# Patient Record
Sex: MALE | Race: BLACK OR AFRICAN AMERICAN
[De-identification: names, ages, dates, MRNs, and addresses within clinical notes are randomized per-mention and may not be internally consistent; named-entity substitution may affect disease eponyms.]

---

## 2017-01-11 ENCOUNTER — HOSPITAL ENCOUNTER (EMERGENCY)
Dept: HOSPITAL 17 - NEPB | Age: 22
Discharge: HOME | End: 2017-01-11
Payer: SELF-PAY

## 2017-01-11 VITALS
HEART RATE: 80 BPM | DIASTOLIC BLOOD PRESSURE: 80 MMHG | RESPIRATION RATE: 14 BRPM | OXYGEN SATURATION: 97 % | TEMPERATURE: 98.8 F | SYSTOLIC BLOOD PRESSURE: 131 MMHG

## 2017-01-11 VITALS — BODY MASS INDEX: 25.06 KG/M2 | WEIGHT: 165.35 LBS | HEIGHT: 68 IN

## 2017-01-11 DIAGNOSIS — J40: Primary | ICD-10-CM

## 2017-01-11 DIAGNOSIS — F19.90: ICD-10-CM

## 2017-01-11 DIAGNOSIS — Z72.0: ICD-10-CM

## 2017-01-11 DIAGNOSIS — J45.909: ICD-10-CM

## 2017-01-11 PROCEDURE — 99283 EMERGENCY DEPT VISIT LOW MDM: CPT

## 2017-01-11 NOTE — PD
HPI


Chief Complaint:  Cold / Flu Symptoms


Time Seen by Provider:  19:16


Travel History


International Travel<30 days:  No


Contact w/Intl Traveler<30days:  No


Traveled to known affect area:  No





History of Present Illness


HPI


21-year-old male presents with a cough.  Symptoms started 2 weeks ago.  The 

cough is nonproductive.  He denies fevers, chills, sore throat, congestion, ear 

pain.  He denies rash, recent travel.  His daughter has a cough as well.  He 

does endorse tobacco product use.  No significant past medical history.  No 

other complaints at this time.





PFS


Past Medical History


ADHD:  No


Asthma:  Yes


Bipolar Disorder:  Yes


Cancer:  No


Cardiovascular Problems:  Yes ("I was told." Heart skips a beat.)


Diabetes:  No


Diminished Hearing:  No


Headaches:  No


Psychiatric:  No


Migraines:  No


Seizures:  No


Thyroid Disease:  No


Ulcer:  No





Past Surgical History


Other Surgery:  No





Social History


Alcohol Use:  Yes


Tobacco Use:  Yes (COUPLE CIGARETTS NOW AND THEN)


Substance Use:  Yes





Allergies-Medications


(Allergen,Severity, Reaction):  


Coded Allergies:  


     Lactose (Verified  Allergy, Mild, 1/11/17)


Reported Meds & Prescriptions





Reported Meds & Active Scripts


Active


No Active Prescriptions or Reported Medications    








Review of Systems


Except as stated in HPI:  all other systems reviewed are Neg





Physical Exam


Narrative


GENERAL: Well-developed well-nourished male in no acute distress


SKIN: Warm and dry.


HEAD: Atraumatic. Normocephalic. 


EYES: Pupils equal and round. No scleral icterus. No injection or drainage. 


ENT: No nasal bleeding or discharge.  Mucous membranes pink and moist.  No oral 

pharyngeal erythema or exudate


NECK: Trachea midline. No JVD.  No lymphadenopathy


CARDIOVASCULAR: Regular rate and rhythm.  No murmur appreciated.


RESPIRATORY: No accessory muscle use. Clear to auscultation. Breath sounds 

equal bilaterally.  No crackles no wheezing no rhonchi





Data


Data


Last Documented VS





Vital Signs








  Date Time  Temp Pulse Resp B/P Pulse Ox O2 Delivery O2 Flow Rate FiO2


 


1/11/17 18:18 98.8 80 14 131/80 97 Room Air  











MDM


Medical Decision Making


Medical Screen Exam Complete:  Yes


Emergency Medical Condition:  Yes


Medical Record Reviewed:  Yes


Differential Diagnosis


Bronchitis, pneumonia, influenza, reactive airway disease, bronchiolitis


Narrative Course


21-year-old male presents with 2 weeks of nonproductive cough and no other 

symptoms.  Examination is benign.  He appears to have a viral bronchitis.  

Symptomatic therapy recommended.  Stable for discharge.





Diagnosis





 Primary Impression:  


 Bronchitis





***Additional Instructions:


Tessalon for cough.  Stay well hydrated.  Follow-up with primary care 

physician.  Return for any emergent medical conditions.


***Med/Other Pt SpecificInfo:  Prescription(s) given


Scripts


Benzonatate (Tessalon Perles)100 Mg [id number] Mg PO TID PRN (COUGH) #20 CAP  Ref 0


   Prov:Cuong Lozano MD         1/11/17


Disposition:  01 DISCHARGE HOME


Condition:  Stable








Avtar Vital Jan 11, 2017 19:24

## 2018-03-01 ENCOUNTER — HOSPITAL ENCOUNTER (EMERGENCY)
Dept: HOSPITAL 17 - NEPD | Age: 23
LOS: 1 days | Discharge: HOME | End: 2018-03-02
Payer: SELF-PAY

## 2018-03-01 VITALS
DIASTOLIC BLOOD PRESSURE: 58 MMHG | SYSTOLIC BLOOD PRESSURE: 129 MMHG | TEMPERATURE: 99 F | OXYGEN SATURATION: 100 % | RESPIRATION RATE: 14 BRPM | HEART RATE: 66 BPM

## 2018-03-01 VITALS — WEIGHT: 159.84 LBS | BODY MASS INDEX: 24.22 KG/M2 | HEIGHT: 68 IN
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
## 2018-03-01 DIAGNOSIS — S61.011A: Primary | ICD-10-CM

## 2018-03-01 PROCEDURE — 12001 RPR S/N/AX/GEN/TRNK 2.5CM/<: CPT

## 2018-03-02 NOTE — PD
HPI


Chief Complaint:  Laceration/Skin Injury


Time Seen by Provider:  00:10


Travel History


International Travel<30 days:  No


Contact w/Intl Traveler<30days:  No


Traveled to known affect area:  No





History of Present Illness


HPI


22-year-old right-hand-dominant black male presents with a laceration to his 

right thumb which occurred earlier this evening.  He had stuck his hand in a 

broken glass in the sink.  Pain is mild.  Up-to-date with immunizations.  No 

numbness or tingling.





PFSH


Past Medical History


Medical History:  Denies Significant Hx


ADHD:  No


Asthma:  Yes


Bipolar Disorder:  Yes


Birth Weight (Kg):  Unknown


Cancer:  No


Cardiovascular Problems:  Yes ("I was told." Heart skips a beat.)


Diabetes:  No


Diminished Hearing:  No


Headaches:  No


Psychiatric:  No


Migraines:  No


Seizures:  No


Thyroid Disease:  No


Ulcer:  No


Tetanus Vaccination:  Unknown


Influenza Vaccination:  No





Past Surgical History


Surgical History:  No Previous Surgery


Other Surgery:  No





Social History


Alcohol Use:  No


Tobacco Use:  Yes (COUPLE CIGARETTS NOW AND THEN)


Substance Use:  No





Allergies-Medications


(Allergen,Severity, Reaction):  


Coded Allergies:  


     lactose (Unverified  Allergy, Mild, 3/1/18)


Reported Meds & Prescriptions





Reported Meds & Active Scripts


Active


Tessalon Perles (Benzonatate) 100 Mg Cap 200 Mg PO TID PRN








Review of Systems


Except as stated in HPI:  all other systems reviewed are Neg





Physical Exam


Narrative


GENERAL: This is a well-nourished, well-developed patient, in no apparent 

distress.


SKIN: No rashes, ecchymoses or lesions. Warm and dry.


HEAD: Atraumatic. Normocephalic.


EYES: PERRL, EOMI, no discharge or injection. No scleral icterus.


EARS: Clear


NOSE: Nasal turbinates appear normal.


THROAT: Mucosa pink and moist.  Airway patent.


NECK: Trachea midline. supple, moves head freely.


LUNGS: Clear to auscultation.


CV: Regular in rhythm.


ABDOMEN: Soft nontender.


EXT: No clubbing cyanosis or edema.  Patient has a 1.8 cm laceration to the 

right distal phalanx of the thumb.  Laceration is in the subcutaneous tissues 

were no tendon, nerve or joint injury.  No foreign body.





Data


Data


Last Documented VS





Vital Signs








  Date Time  Temp Pulse Resp B/P (MAP) Pulse Ox O2 Delivery O2 Flow Rate FiO2


 


3/1/18 23:25 99.0 66 14 129/58 (81) 100   











MDM


Medical Decision Making


Medical Screen Exam Complete:  Yes


Emergency Medical Condition:  Yes


Medical Record Reviewed:  Yes


Differential Diagnosis


MDM: High


Differential diagnoses: Fracture, sprain, strain, dislocation, contusion, 

neurovascular injury


Narrative Course


Patient's finger laceration is closed with sutures





Procedures


**Procedure Narrative**


LACERATION


LOCATION: Right thumb distal phalanx


LENGTH: 1.8 cm


NUMBER OF STITCHES/STAPLES: 4





REPAIR: The area of the laceration was prepped with Betadine and sterilely 

draped.  The laceration was infiltrated with 1% lidocaine digital block.  The 

wound was copiously irrigated and explored without evidence of foreign body, 

tendon injury or neurovascular injury.  The wound was closed using 4-0 Prolene. 

This was a  single layer repair. A sterile dressing was applied. The patient 

was advised to keep the dressing clean and dry. Patient tolerated the procedure 

well.





Diagnosis





 Primary Impression:  


 Finger laceration


 Qualified Codes:  S61.011A - Laceration without foreign body of right thumb 

without damage to nail, initial encounter


Patient Instructions:  General Instructions


Departure Forms:  Tests/Procedures, Work Release   


   Special Instructions:  No work today





***Additional Instructions:  


Rest.


Elevation.


Keep clean and dry.


Daily wound care with soap, water, Neosporin.


Tylenol and Advil for pain.


Sutures out in 12 days.


Return to the ER for any problems.


***Med/Other Pt SpecificInfo:  No Meds Exist/No RX given, Wound Care


Disposition:  01 DISCHARGE HOME


Condition:  Stable











Alec Del Real Mar 2, 2018 00:30

## 2018-03-03 ENCOUNTER — HOSPITAL ENCOUNTER (EMERGENCY)
Dept: HOSPITAL 17 - NEPD | Age: 23
LOS: 1 days | Discharge: HOME | End: 2018-03-04
Payer: SELF-PAY

## 2018-03-03 VITALS — WEIGHT: 160.94 LBS | BODY MASS INDEX: 24.39 KG/M2 | HEIGHT: 68 IN

## 2018-03-03 DIAGNOSIS — J45.909: ICD-10-CM

## 2018-03-03 DIAGNOSIS — S61.011D: Primary | ICD-10-CM

## 2018-03-03 DIAGNOSIS — F31.9: ICD-10-CM
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
## 2018-03-03 DIAGNOSIS — F17.210: ICD-10-CM

## 2018-03-03 PROCEDURE — 99281 EMR DPT VST MAYX REQ PHY/QHP: CPT

## 2018-03-04 VITALS
DIASTOLIC BLOOD PRESSURE: 55 MMHG | SYSTOLIC BLOOD PRESSURE: 104 MMHG | OXYGEN SATURATION: 100 % | RESPIRATION RATE: 20 BRPM | TEMPERATURE: 98.8 F | HEART RATE: 73 BPM

## 2018-03-04 NOTE — PD
HPI


Chief Complaint:  Wound/Suture/Staple Re-Check


Time Seen by Provider:  01:09


Travel History


International Travel<30 days:  No


Contact w/Intl Traveler<30days:  No


Traveled to known affect area:  No





History of Present Illness


HPI


22-year-old male presents emergency department for recheck of his right thumb 

laceration.  He states that 1 of his sutures pulled out today when he was 

sleeping.  He noticed some blood.  He was concerned that it may be getting 

infected.  He denies any fever chills.  No redness.  No pus discharge.  No pain.





PFSH


Past Medical History


ADHD:  No


Asthma:  Yes


Bipolar Disorder:  Yes


Cancer:  No


Cardiovascular Problems:  Yes ("I was told." Heart skips a beat.)


Diabetes:  No


Diminished Hearing:  No


Headaches:  No


Psychiatric:  No


Migraines:  No


Seizures:  No


Thyroid Disease:  No


Ulcer:  No





Past Surgical History


Other Surgery:  No





Social History


Alcohol Use:  No


Tobacco Use:  Yes (COUPLE CIGARETTS NOW AND THEN)


Substance Use:  No





Allergies-Medications


(Allergen,Severity, Reaction):  


Coded Allergies:  


     lactose (Unverified  Allergy, Mild, 3/1/18)


Reported Meds & Prescriptions





Reported Meds & Active Scripts


Active


Tessalon Perles (Benzonatate) 100 Mg Cap 200 Mg PO TID PRN








Review of Systems


Except as stated in HPI:  all other systems reviewed are Neg





Physical Exam


Narrative


GENERAL: This is a well-nourished, well-developed patient, in no apparent 

distress.


SKIN: No rashes, ecchymoses or lesions. Warm and dry.


HEAD: Atraumatic. Normocephalic.


EYES: PERRL, EOMI, no discharge or injection. No scleral icterus.


EARS: Clear


NOSE: Nasal turbinates appear normal.


THROAT: Mucosa pink and moist.  Airway patent.


NECK: Trachea midline. supple, moves head freely.


LUNGS: Clear to auscultation.


CV: Regular in rhythm.


ABDOMEN: Soft nontender.


EXT: No clubbing cyanosis or edema.  Patient has a healing laceration to the 

right thumb without signs of infection.





Data


Data


Last Documented VS





Vital Signs








  Date Time  Temp Pulse Resp B/P (MAP) Pulse Ox O2 Delivery O2 Flow Rate FiO2


 


3/4/18 00:00 98.8 73 20 104/55 (71) 100 Room Air  








Orders





 Orders


Ed Discharge Order (3/4/18 01:21)








MDM


Medical Decision Making


Medical Screen Exam Complete:  Yes


Emergency Medical Condition:  Yes


Medical Record Reviewed:  Yes


Differential Diagnosis


MDM: High


Differential diagnoses: Fracture, sprain, strain, dislocation, contusion, 

neurovascular injury, wound infection


Narrative Course


Patient has a healing laceration to the right thumb without infection.





Diagnosis





 Primary Impression:  


 Healing laceration


Patient Instructions:  General Instructions





***Additional Instructions:  


Rest.


Elevation.


Daily wound care with soap and water.


Sutures out in 10 days.


Return to the ER for emergencies.


***Med/Other Pt SpecificInfo:  Wound Care


Disposition:  01 DISCHARGE HOME


Condition:  Stable











Alec Del Real Mar 4, 2018 01:24

## 2018-05-04 ENCOUNTER — HOSPITAL ENCOUNTER (EMERGENCY)
Dept: HOSPITAL 17 - NEPD | Age: 23
Discharge: HOME | End: 2018-05-04
Payer: MEDICAID

## 2018-05-04 VITALS
SYSTOLIC BLOOD PRESSURE: 111 MMHG | HEART RATE: 80 BPM | TEMPERATURE: 98.4 F | OXYGEN SATURATION: 100 % | DIASTOLIC BLOOD PRESSURE: 58 MMHG | RESPIRATION RATE: 17 BRPM

## 2018-05-04 VITALS — HEIGHT: 68 IN | BODY MASS INDEX: 23.05 KG/M2 | WEIGHT: 152.12 LBS

## 2018-05-04 DIAGNOSIS — F17.210: ICD-10-CM

## 2018-05-04 DIAGNOSIS — F31.9: ICD-10-CM

## 2018-05-04 DIAGNOSIS — R10.13: ICD-10-CM

## 2018-05-04 DIAGNOSIS — R11.2: Primary | ICD-10-CM

## 2018-05-04 DIAGNOSIS — J45.909: ICD-10-CM

## 2018-05-04 LAB
ALBUMIN SERPL-MCNC: 4 GM/DL (ref 3.4–5)
ALP SERPL-CCNC: 58 U/L (ref 45–117)
ALT SERPL-CCNC: 14 U/L (ref 9–52)
AST SERPL-CCNC: 21 U/L (ref 15–39)
BACTERIA #/AREA URNS HPF: (no result) /HPF
BASOPHILS # BLD AUTO: 0 TH/MM3 (ref 0–0.2)
BASOPHILS NFR BLD: 0.5 % (ref 0–2)
BILIRUB SERPL-MCNC: 0.7 MG/DL (ref 0.2–1)
BUN SERPL-MCNC: 11 MG/DL (ref 7–18)
CALCIUM SERPL-MCNC: 9 MG/DL (ref 8.5–10.1)
CHLORIDE SERPL-SCNC: 106 MEQ/L (ref 98–107)
COLOR UR: YELLOW
CREAT SERPL-MCNC: 1.02 MG/DL (ref 0.6–1.3)
EOSINOPHIL # BLD: 0.1 TH/MM3 (ref 0–0.4)
EOSINOPHIL NFR BLD: 0.9 % (ref 0–4)
ERYTHROCYTE [DISTWIDTH] IN BLOOD BY AUTOMATED COUNT: 14.3 % (ref 11.6–17.2)
GFR SERPLBLD BASED ON 1.73 SQ M-ARVRAT: 113 ML/MIN (ref 89–?)
GLUCOSE SERPL-MCNC: 62 MG/DL (ref 74–106)
GLUCOSE UR STRIP-MCNC: (no result) MG/DL
HCO3 BLD-SCNC: 29.1 MEQ/L (ref 21–32)
HCT VFR BLD CALC: 42.7 % (ref 39–51)
HGB BLD-MCNC: 14.4 GM/DL (ref 13–17)
HGB UR QL STRIP: (no result)
INR PPP: 1.1 RATIO
KETONES UR STRIP-MCNC: (no result) MG/DL
LYMPHOCYTES # BLD AUTO: 1.8 TH/MM3 (ref 1–4.8)
LYMPHOCYTES NFR BLD AUTO: 31 % (ref 9–44)
MCH RBC QN AUTO: 29.4 PG (ref 27–34)
MCHC RBC AUTO-ENTMCNC: 33.9 % (ref 32–36)
MCV RBC AUTO: 86.9 FL (ref 80–100)
MONOCYTE #: 0.4 TH/MM3 (ref 0–0.9)
MONOCYTES NFR BLD: 7.7 % (ref 0–8)
MUCOUS THREADS #/AREA URNS LPF: (no result) /LPF
NEUTROPHILS # BLD AUTO: 3.5 TH/MM3 (ref 1.8–7.7)
NEUTROPHILS NFR BLD AUTO: 59.9 % (ref 16–70)
NITRITE UR QL STRIP: (no result)
PLATELET # BLD: 256 TH/MM3 (ref 150–450)
PMV BLD AUTO: 7.8 FL (ref 7–11)
PROT SERPL-MCNC: 7.6 GM/DL (ref 6.4–8.2)
PROTHROMBIN TIME: 11.5 SEC (ref 9.8–11.6)
RBC # BLD AUTO: 4.91 MIL/MM3 (ref 4.5–5.9)
SODIUM SERPL-SCNC: 142 MEQ/L (ref 136–145)
SP GR UR STRIP: 1.01 (ref 1–1.03)
URINE LEUKOCYTE ESTERASE: (no result)
WBC # BLD AUTO: 5.8 TH/MM3 (ref 4–11)

## 2018-05-04 PROCEDURE — 74177 CT ABD & PELVIS W/CONTRAST: CPT

## 2018-05-04 PROCEDURE — 96375 TX/PRO/DX INJ NEW DRUG ADDON: CPT

## 2018-05-04 PROCEDURE — C9113 INJ PANTOPRAZOLE SODIUM, VIA: HCPCS

## 2018-05-04 PROCEDURE — 96374 THER/PROPH/DIAG INJ IV PUSH: CPT

## 2018-05-04 PROCEDURE — 80053 COMPREHEN METABOLIC PANEL: CPT

## 2018-05-04 PROCEDURE — 96361 HYDRATE IV INFUSION ADD-ON: CPT

## 2018-05-04 PROCEDURE — 81001 URINALYSIS AUTO W/SCOPE: CPT

## 2018-05-04 PROCEDURE — 85730 THROMBOPLASTIN TIME PARTIAL: CPT

## 2018-05-04 PROCEDURE — 85025 COMPLETE CBC W/AUTO DIFF WBC: CPT

## 2018-05-04 PROCEDURE — 85610 PROTHROMBIN TIME: CPT

## 2018-05-04 PROCEDURE — 83690 ASSAY OF LIPASE: CPT

## 2018-05-04 PROCEDURE — 99284 EMERGENCY DEPT VISIT MOD MDM: CPT

## 2018-05-04 NOTE — RADRPT
EXAM DATE/TIME:  05/04/2018 18:00 

 

[facility] COMPARISON:     

No previous studies available for comparison.

 

 

INDICATIONS :     

Vomiting blood.

                      

 

IV CONTRAST:     

68 cc Omnipaque 350 (iohexol) IV 

 

 

ORAL CONTRAST:      

No oral contrast ingested.

                      

 

RADIATION DOSE:     

5.76 CTDIvol (mGy) 

 

 

MEDICAL HISTORY :     

Cardiovascular disease.  

 

SURGICAL HISTORY :      

None. 

 

ENCOUNTER:      

Initial

 

ACUITY:      

1 day

 

PAIN SCALE:      

4/10

 

LOCATION:         

abdomen

 

TECHNIQUE:     

Volumetric scanning of the abdomen and pelvis was performed.  Using automated exposure control and ad
justment of the mA and/or kV according to patient size, radiation dose was kept as low as reasonably 
achievable to obtain optimal diagnostic quality images.  DICOM format image data is available electro
nically for review and comparison.  

 

FINDINGS:     

 

LOWER LUNGS:     

The visualized lower lungs are clear.

 

LIVER:     

Homogeneous density without lesion.  There is no dilation of the biliary tree.  No calcified gallston
es.

 

SPLEEN:     

Normal size without lesion.

 

PANCREAS:     

Within normal limits.

 

KIDNEYS:     

Normal in size and shape.  There is no mass, stone or hydronephrosis.

 

ADRENAL GLANDS:     

Within normal limits.

 

VASCULAR:     

There is no aortic aneurysm.

 

BOWEL/MESENTERY:     

The stomach, small bowel, and colon demonstrate no acute abnormality.  There is no free intraperitone
al air or fluid.

 

ABDOMINAL WALL:     

Within normal limits.

 

RETROPERITONEUM:     

There is no lymphadenopathy. 

 

BLADDER:     

No wall thickening or mass. 

 

REPRODUCTIVE:     

Within normal limits.

 

INGUINAL:     

There is no lymphadenopathy or hernia. 

 

MUSCULOSKELETAL:     

Within normal limits for patient age. 

 

CONCLUSION:     Normal examination.  

 

 

 

 Alec Maier MD on May 04, 2018 at 18:28           

Board Certified Radiologist.

 This report was verified electronically.

## 2018-05-04 NOTE — PD
Data


Data


Last Documented VS





Vital Signs








  Date Time  Temp Pulse Resp B/P (MAP) Pulse Ox O2 Delivery O2 Flow Rate FiO2


 


5/4/18 14:44 98.4 80 17 111/58 (75) 100   








Orders





 Orders


Complete Blood Count With Diff (5/4/18 14:58)


Comprehensive Metabolic Panel (5/4/18 14:58)


Lipase (5/4/18 14:58)


Prothrombin Time / Inr (Pt) (5/4/18 14:58)


Act Partial Throm Time (Ptt) (5/4/18 14:58)


Urinalysis - C+S If Indicated (5/4/18 14:58)


Ct Abd/Pel W Iv Contrast(Rout) (5/4/18 14:58)


Iv Access Insert/Monitor (5/4/18 14:58)


Ecg Monitoring (5/4/18 14:58)


Oximetry (5/4/18 14:58)


Ondansetron Inj (Zofran Inj) (5/4/18 15:00)


Pantoprazole Inj (Protonix Inj) (5/4/18 15:00)


Sodium Chlor 0.9% 1000 Ml Inj (Ns 1000 M (5/4/18 14:58)


Sodium Chloride 0.9% Flush (Ns Flush) (5/4/18 15:00)


Al-Mag Hy-Si 40-40-4 Mg/Ml Liq (Mag-Al P (5/4/18 15:00)


Lidocaine 2% Viscous (Xylocaine 2% Visco (5/4/18 15:00)


Dextrose 50% In Flor (Vial) Inj (D50w (Vi (5/4/18 18:00)


Iohexol 350 Inj (Omnipaque 350 Inj) (5/4/18 18:03)


Ed Discharge Order (5/4/18 18:37)





Labs





Laboratory Tests








Test


  5/4/18


15:25 5/4/18


16:30


 


White Blood Count 5.8 TH/MM3  


 


Red Blood Count 4.91 MIL/MM3  


 


Hemoglobin 14.4 GM/DL  


 


Hematocrit 42.7 %  


 


Mean Corpuscular Volume 86.9 FL  


 


Mean Corpuscular Hemoglobin 29.4 PG  


 


Mean Corpuscular Hemoglobin


Concent 33.9 % 


  


 


 


Red Cell Distribution Width 14.3 %  


 


Platelet Count 256 TH/MM3  


 


Mean Platelet Volume 7.8 FL  


 


Neutrophils (%) (Auto) 59.9 %  


 


Lymphocytes (%) (Auto) 31.0 %  


 


Monocytes (%) (Auto) 7.7 %  


 


Eosinophils (%) (Auto) 0.9 %  


 


Basophils (%) (Auto) 0.5 %  


 


Neutrophils # (Auto) 3.5 TH/MM3  


 


Lymphocytes # (Auto) 1.8 TH/MM3  


 


Monocytes # (Auto) 0.4 TH/MM3  


 


Eosinophils # (Auto) 0.1 TH/MM3  


 


Basophils # (Auto) 0.0 TH/MM3  


 


CBC Comment DIFF FINAL  


 


Differential Comment   


 


Prothrombin Time 11.5 SEC  


 


Prothromb Time International


Ratio 1.1 RATIO 


  


 


 


Activated Partial


Thromboplast Time 25.2 SEC 


  


 


 


Blood Urea Nitrogen 11 MG/DL  


 


Creatinine 1.02 MG/DL  


 


Random Glucose 62 MG/DL  


 


Total Protein 7.6 GM/DL  


 


Albumin 4.0 GM/DL  


 


Calcium Level 9.0 MG/DL  


 


Alkaline Phosphatase 58 U/L  


 


Aspartate Amino Transf


(AST/SGOT) 21 U/L 


  


 


 


Alanine Aminotransferase


(ALT/SGPT) 14 U/L 


  


 


 


Total Bilirubin 0.7 MG/DL  


 


Sodium Level 142 MEQ/L  


 


Potassium Level 3.6 MEQ/L  


 


Chloride Level 106 MEQ/L  


 


Carbon Dioxide Level 29.1 MEQ/L  


 


Anion Gap 7 MEQ/L  


 


Estimat Glomerular Filtration


Rate 113 ML/MIN 


  


 


 


Lipase 97 U/L  


 


Urine Color  YELLOW 


 


Urine Turbidity  CLEAR 


 


Urine pH  7.0 


 


Urine Specific Gravity  1.010 


 


Urine Protein  NEG mg/dL 


 


Urine Glucose (UA)  NEG mg/dL 


 


Urine Ketones  NEG mg/dL 


 


Urine Occult Blood  NEG 


 


Urine Nitrite  NEG 


 


Urine Bilirubin  NEG 


 


Urine Urobilinogen  0.2 MG/DL 


 


Urine Leukocyte Esterase  TRACE 


 


Urine RBC  1 /hpf 


 


Urine WBC  2 /hpf 


 


Urine Bacteria  RARE /hpf 


 


Urine Mucus  FEW /lpf 


 


Microscopic Urinalysis Comment


  


  CULT NOT


INDICATED











MDM


Supervised Visit with KRISHNA:  Yes


Narrative Course


The history, exam, and medical decision-making in the associated midlevel 

provider note were completed with my assistance. I reviewed and agree with the 

findings presented.  I attest that I had a face-to-face encounter with the 

patient on the same day, and personally performed and documented my assessment 

and findings in the medical record. 





*My assessment and Findings: This is a 22-year-old male who presents to the 

emergency department with hematemesis has been going on for 3 months.  Labs and 

CT were reassuring.  Patient requires follow-up with GI and an endoscopy as an 

outpatient.  He does not have insurance.  I gave him a referral to [facility] to establish primary care follow-up.  Otherwise I think is stable for 

discharge and there is no evidence of life-threatening GI bleed at this time.


Diagnosis





 Primary Impression:  


 Nausea and vomiting


 Additional Impression:  


 History of hematemesis


Referrals:  


Chaparrita Coyle MD





***Additional Instruction:  


Medication as needed, slowly advance diet as tolerated.  Follow-up with a 

gastroenterologist such as Dr. Coyle and return for any emergent medical 

conditions.


Scripts


Ondansetron (Zofran) 4 Mg Tab


4 MG PO Q6HR Y for NAUSEA OR VOMITING, #20 TAB 0 Refills


   Prov: Suzy Elizabeth MD         5/4/18


Disposition:  01 DISCHARGE HOME


Condition:  Stable











Suzy Elizabeth MD May 4, 2018 19:12

## 2018-05-04 NOTE — PD
HPI


Chief Complaint:  GI Complaint


Time Seen by Provider:  14:48


Travel History


International Travel<30 days:  No


Contact w/Intl Traveler<30days:  No


Traveled to known affect area:  No





History of Present Illness


HPI


This is a 22-year-old male who presents for evaluation of hematemesis and 

abdominal pain.  He reports that for 3 months he has had multiple episodes of 

hematemesis with dark blood streaks.  He reports that he has had epigastric 

abdominal pain and bloating sensation for the past 1.5 months.  He reports that 

he has been seen here about this several times in the past and told that he has 

a viral gastroenteritis and the recommendation was for bland diet.  He reports 

that symptoms have persisted and that is what prompted evaluation today.  He 

reports that he has had weight loss.  He denies dysuria, flank pain, chest pain

, fevers or chills.  He denies any frequent over-the-counter NSAID use.  He did 

take ibuprofen yesterday for headache.  He denies any alcohol use.  He has no 

other complaints at this time.





PFSH


Past Medical History


ADHD:  No


Asthma:  Yes


Bipolar Disorder:  Yes


Cancer:  No


Cardiovascular Problems:  Yes ("I was told." Heart skips a beat.)


Diabetes:  No


Diminished Hearing:  No


Headaches:  No


Psychiatric:  No


Migraines:  No


Seizures:  No


Thyroid Disease:  No


Ulcer:  No





Past Surgical History


Other Surgery:  No





Social History


Alcohol Use:  No


Tobacco Use:  Yes ([employer] C[employer]ARETTS NOW AND THEN)


Substance Use:  No





Allergies-Medications


(Allergen,Severity, Reaction):  


Coded Allergies:  


     lactose (Unverified  Allergy, Mild, 3/1/18)


Reported Meds & Prescriptions





Reported Meds & Active Scripts


Active


Zofran (Ondansetron HCl) 4 Mg Tab 4 Mg PO Q6HR PRN








Review of Systems


Except as stated in HPI:  all other systems reviewed are Neg





Physical Exam


Narrative


GENERAL: Well-developed well-nourished male no acute distress


SKIN: Warm and dry.


HEAD: Atraumatic. Normocephalic. 


EYES: Pupils equal and round. No scleral icterus. No injection or drainage. 


ENT: No nasal bleeding or discharge.  Mucous membranes pink and moist.


NECK: Trachea midline. No JVD. 


CARDIOVASCULAR: Regular rate and rhythm.  No murmur appreciated.


RESPIRATORY: No accessory muscle use. Clear to auscultation. Breath sounds 

equal bilaterally. 


GASTROINTESTINAL: Abdomen soft, some tenderness to palpation in the epigastrium 

without guarding.  Rectal examination reveals yellow tinged stool which is 

faintly Hemoccult positive.


MUSCULOSKELETAL: No obvious deformities. No clubbing.  No cyanosis.  No edema. 


NEUROLOGICAL: Awake and alert. No obvious cranial nerve deficits.  Motor 

grossly within normal limits. Normal speech.





Data


Data


Last Documented VS





Vital Signs








  Date Time  Temp Pulse Resp B/P (MAP) Pulse Ox O2 Delivery O2 Flow Rate FiO2


 


5/4/18 14:44 98.4 80 17 111/58 (75) 100   








Orders





 Orders


Complete Blood Count With Diff (5/4/18 14:58)


Comprehensive Metabolic Panel (5/4/18 14:58)


Lipase (5/4/18 14:58)


Prothrombin Time / Inr (Pt) (5/4/18 14:58)


Act Partial Throm Time (Ptt) (5/4/18 14:58)


Urinalysis - C+S If Indicated (5/4/18 14:58)


Ct Abd/Pel W Iv Contrast(Rout) (5/4/18 14:58)


Iv Access Insert/Monitor (5/4/18 14:58)


Ecg Monitoring (5/4/18 14:58)


Oximetry (5/4/18 14:58)


Ondansetron Inj (Zofran Inj) (5/4/18 15:00)


Pantoprazole Inj (Protonix Inj) (5/4/18 15:00)


Sodium Chlor 0.9% 1000 Ml Inj (Ns 1000 M (5/4/18 14:58)


Sodium Chloride 0.9% Flush (Ns Flush) (5/4/18 15:00)


Al-Mag Hy-Si 40-40-4 Mg/Ml Liq (Mag-Al P (5/4/18 15:00)


Lidocaine 2% Viscous (Xylocaine 2% Visco (5/4/18 15:00)


Dextrose 50% In Flor (Vial) Inj (D50w (Vi (5/4/18 18:00)


Iohexol 350 Inj (Omnipaque 350 Inj) (5/4/18 18:03)


Ed Discharge Order (5/4/18 18:37)





Labs





Laboratory Tests








Test


  5/4/18


15:25 5/4/18


16:30


 


White Blood Count 5.8 TH/MM3  


 


Red Blood Count 4.91 MIL/MM3  


 


Hemoglobin 14.4 GM/DL  


 


Hematocrit 42.7 %  


 


Mean Corpuscular Volume 86.9 FL  


 


Mean Corpuscular Hemoglobin 29.4 PG  


 


Mean Corpuscular Hemoglobin


Concent 33.9 % 


  


 


 


Red Cell Distribution Width 14.3 %  


 


Platelet Count 256 TH/MM3  


 


Mean Platelet Volume 7.8 FL  


 


Neutrophils (%) (Auto) 59.9 %  


 


Lymphocytes (%) (Auto) 31.0 %  


 


Monocytes (%) (Auto) 7.7 %  


 


Eosinophils (%) (Auto) 0.9 %  


 


Basophils (%) (Auto) 0.5 %  


 


Neutrophils # (Auto) 3.5 TH/MM3  


 


Lymphocytes # (Auto) 1.8 TH/MM3  


 


Monocytes # (Auto) 0.4 TH/MM3  


 


Eosinophils # (Auto) 0.1 TH/MM3  


 


Basophils # (Auto) 0.0 TH/MM3  


 


CBC Comment DIFF FINAL  


 


Differential Comment   


 


Prothrombin Time 11.5 SEC  


 


Prothromb Time International


Ratio 1.1 RATIO 


  


 


 


Activated Partial


Thromboplast Time 25.2 SEC 


  


 


 


Blood Urea Nitrogen 11 MG/DL  


 


Creatinine 1.02 MG/DL  


 


Random Glucose 62 MG/DL  


 


Total Protein 7.6 GM/DL  


 


Albumin 4.0 GM/DL  


 


Calcium Level 9.0 MG/DL  


 


Alkaline Phosphatase 58 U/L  


 


Aspartate Amino Transf


(AST/SGOT) 21 U/L 


  


 


 


Alanine Aminotransferase


(ALT/SGPT) 14 U/L 


  


 


 


Total Bilirubin 0.7 MG/DL  


 


Sodium Level 142 MEQ/L  


 


Potassium Level 3.6 MEQ/L  


 


Chloride Level 106 MEQ/L  


 


Carbon Dioxide Level 29.1 MEQ/L  


 


Anion Gap 7 MEQ/L  


 


Estimat Glomerular Filtration


Rate 113 ML/MIN 


  


 


 


Lipase 97 U/L  


 


Urine Color  YELLOW 


 


Urine Turbidity  CLEAR 


 


Urine pH  7.0 


 


Urine Specific Gravity  1.010 


 


Urine Protein  NEG mg/dL 


 


Urine Glucose (UA)  NEG mg/dL 


 


Urine Ketones  NEG mg/dL 


 


Urine Occult Blood  NEG 


 


Urine Nitrite  NEG 


 


Urine Bilirubin  NEG 


 


Urine Urobilinogen  0.2 MG/DL 


 


Urine Leukocyte Esterase  TRACE 


 


Urine RBC  1 /hpf 


 


Urine WBC  2 /hpf 


 


Urine Bacteria  RARE /hpf 


 


Urine Mucus  FEW /lpf 


 


Microscopic Urinalysis Comment


  


  CULT NOT


INDICATED











MDM


Medical Decision Making


Medical Screen Exam Complete:  Yes


Emergency Medical Condition:  Yes


Medical Record Reviewed:  Yes


Differential Diagnosis


Peptic ulcer disease, gastritis, B[place], Nikki-Hastings tear, colitis, 

inflammatory bowel disease, cannabinoid induced emesis, malignancy


Narrative Course


22-year-old male presents with 3 months of daily hematemesis and 1.5 months of 

epigastric abdominal pain.  Lab work, CT abdomen pelvis has been ordered.  The 

patient will be IV fluids, Zofran, GI cocktail and Protonix.





The patient's lab work is been reviewed and found to be very reassuring.  His 

hemoglobin is within normal limits.  Glucose is slightly low at 62.  The 

patient was given D50.  His CT abdomen pelvis is normal as well.  At this point 

in time the plan would be to have him follow-up with a gastroenterologist for 

further evaluation.  He will be given a prescription for Zofran.





Diagnosis





 Primary Impression:  


 Nausea and vomiting


 Additional Impression:  


 History of hematemesis


Referrals:  


Chaparrita Coyle MD





***Additional Instructions:  


Medication as needed, slowly advance diet as tolerated.  Follow-up with a 

gastroenterologist such as Dr. Coyle and return for any emergent medical 

conditions.


***Med/Other Pt SpecificInfo:  Prescription(s) given


Scripts


Ondansetron (Zofran) 4 Mg Tab


4 MG PO Q6HR Y for NAUSEA OR VOMITING, #20 TAB 0 Refills


   Prov: Suzy Elizabeth MD         5/4/18


Disposition:  01 DISCHARGE HOME


Condition:  Stable











Avtar Vital May 4, 2018 15:04